# Patient Record
Sex: FEMALE | Race: WHITE
[De-identification: names, ages, dates, MRNs, and addresses within clinical notes are randomized per-mention and may not be internally consistent; named-entity substitution may affect disease eponyms.]

---

## 2017-08-18 NOTE — PCM.LDHP
L&D History of Present Illness





- General


Date of Service: 17


Admit Problem/Dx: 


 Patient Status Order with Admit Dx/Problem





17 16:39


Patient Status [ADT] Routine 








 Admission Diagnosis/Problem











Admission Diagnosis/Problem    High risk pregnancy














Source of Information: Patient


History Limitations: Reports: No Limitations





- History of Present Illness


Introduction:: 





26-year-old  at 39w0d presented to L&D as a transfer from the clinic for 

gestational hypertension.  Her BP in the clinic was 160/115 and 150/98.  She 

denies any headaches, vision changes or swelling.  Baby has been active.  No 

vaginal bleeding or leaking of fluid.





- Related Data


Allergies/Adverse Reactions: 


 Allergies











Allergy/AdvReac Type Severity Reaction Status Date / Time


 


Penicillins Allergy  Hives Verified 17 16:36











Home Medications: 


 Home Meds





PNV95/Ferrous Fumarate/FA [Prenatal Tablet] 1 tab PO DAILY 17 [History]











Past Medical History


Cardiovascular History: Reports: Hypertension (Was on meds for a while, none 

for a while)





Social & Family History





- Family History


Cardiac: Reports: CAD (Paternal grandfather)


OBGYN: Reports: Other (See Below) (sister with preeclampsia)





H&P Review of Systems





- Review of Systems:


Review Of Systems: See Below


General: Reports: No Symptoms


HEENT: Reports: No Symptoms


Pulmonary: Reports: No Symptoms


Cardiovascular: Reports: No Symptoms


Gastrointestinal: Reports: No Symptoms


Genitourinary: Reports: No Symptoms


Musculoskeletal: Reports: No Symptoms


Skin: Reports: No Symptoms





L&D Exam





- Exam


Exam: See Below





- OB Specific


Fundal Height In cm: 39


Fetal Movement: Active


Fetal Heart Tones: Present


Fetal Heart Tones per Min: 135 (Doppler in clinic)


Birth Presentation: Vertex





- Alegre Score


Alegre Score Cervix Position: Posterior


Alegre Score Consistency: Soft


Alegre Score Effacement: 31-50%


Alegre Score Dilation: 1-2 cm


Alegre Score Infant's Station: -2


Alegre Score Total: 5





- Exam


General: Alert, Oriented


HEENT: Conjunctiva Clear, Posterior Pharynx Clear, Pupils Equal


Lungs: Clear to Auscultation, Normal Respiratory Effort


Cardiovascular: Regular Rate, Regular Rhythm.  No: Systolic Murmur, Diastolic 

Murmur


Genitourinary: Normal external exam


Back Exam: Normal Inspection


Extremities: No Pedal Edema (Trace to lower extremities bilaterally)


Skin: Warm, Dry, Intact





- Problem List


(1) Prenatal care in third trimester


SNOMED Code(s): 847369496, 55720194, 48303099, 620219814, 284931710


   ICD Code: Z34.93 - ENCNTR FOR SUPRVSN OF NORMAL PREG, UNSP, THIRD TRIMESTER 

  Status: Acute   Current Visit: Yes   





(2) Gestational hypertension


SNOMED Code(s): 00665949


   ICD Code: O13.9 - GESTATIONAL HTN W/O SIGNIFICANT PROTEINURIA, UNSP 

TRIMESTER   Status: Acute   Current Visit: Yes   


Problem List Initiated/Reviewed/Updated: Yes


Orders Last 24hrs: 


 Active Orders 24 hr











 Category Date Time Status


 


 Patient Status [ADT] Routine ADT  17 16:39 Ordered


 


 Communication Order [RC] ASDIRECTED Care  17 16:39 Ordered


 


 Communication Order [RC] ASDIRECTED Care  17 16:42 Ordered


 


 Communication Order [RC] ASDIRECTED Care  17 16:42 Ordered


 


 Communication Order [RC] ASDIRECTED Care  17 16:42 Ordered


 


 Communication Order [RC] ASDIRECTED Care  17 16:42 Ordered


 


 Communication Order [RC] ASDIRECTED Care  17 16:42 Ordered


 


 Fetal Heart Tones [RC] PER UNIT ROUTINE Care  17 16:39 Ordered


 


 Fetal Monitoring [RC] PER UNIT ROUTINE Care  17 16:42 Ordered


 


 Notify Provider Vital Signs OB [RC] ASDIRECTED Care  17 16:39 Ordered


 


 Notify Provider [RC] PRN Care  17 16:39 Ordered


 


 Notify Provider [RC] PRN Care  17 16:42 Ordered


 


 Notify Provider [RC] PRN Care  17 16:42 Ordered


 


 Notify Provider [RC] STAT Care  17 16:42 Ordered


 


 Peripheral IV Care [RC] .AS DIRECTED Care  17 16:43 Ordered


 


 Pump Management, Intrathecal [RC] ASDIRECTED Care  17 16:38 Ordered


 


 Up ad Elaine [RC] ASDIRECTED Care  17 16:39 Ordered


 


 Vaginal Exam [RC] PRN Care  17 16:42 Ordered


 


 Vital Signs [RC] PER UNIT ROUTINE Care  17 16:39 Ordered


 


 Clear Liquid Diet [DIET] Diet  17 Dinner Ordered


 


 ALANINE AMINOTRANSFERASE,ALT [CHEM] Stat Lab  17 16:41 Ordered


 


 ASPARTATE AMNIOTRANSFERASE,AST [CHEM] Stat Lab  17 16:41 Ordered


 


 BLOOD UREA NITROGEN,BUN [CHEM] Stat Lab  17 16:41 Ordered


 


 CBC WITH AUTO DIFF [HEME] Stat Lab  17 16:41 Ordered


 


 LACTATE DEHYDROGENASE,LDH [CHEM] Stat Lab  17 16:41 Ordered


 


 PROTEIN/CREATININE RATIO URINE Stat Lab  17 16:42 Ordered


 


 URIC ACID [CHEM] Stat Lab  17 16:41 Ordered


 


 Acetaminophen [Tylenol] Med  17 16:38 Ordered





 650 mg PO Q4H PRN   


 


 Carboprost Tromethamine [Hemabate DS] Med  17 16:38 Ordered





 250 mcg IM ASDIRECTED PRN   


 


 Labetalol [Normodyne] Med  17 17:30 Ordered





 100 mg PO TID   


 


 Labetalol [Normodyne] Med  17 16:46 Ordered





 20 mg IV Q6HR PRN   


 


 Lactated Ringers @ 125 MLS/HR(1000ml) Med  17 16:45 Ordered





 Lactated Ringers [Ringers, Lactated] 1,000 ml   





 IV ASDIRECTED   


 


 Lactated Ringers [Ringers, Lactated] 500 ml Med  17 16:38 Ordered





 IV .BOLUS   


 


 Lidocaine 1% [Xylocaine-MPF 1%] Med  17 16:38 Ordered





 10 ml INJECT ASDIRECTED PRN   


 


 Methylergonovine [Methergine] Med  17 16:38 Ordered





 0.2 mg IM ASDIRECTED PRN   


 


 Misoprostol [Cytotec] Med  17 16:42 Ordered





 25 mcg VAG Q4H PRN   


 


 Misoprostol [Cytotec] Med  17 16:38 Ordered





 800 mcg RECTAL ASDIRECTED PRN   


 


 Nalbuphine [Nubain] Med  17 16:38 Ordered





 10 mg IVPUSH Q3H PRN   


 


 Ondansetron [Zofran] Med  17 16:38 Ordered





 4 mg IV Q4H PRN   


 


 Oxytocin 30 Units in NS @ 2 MUNITS/MIN(500ml) Med  17 16:45 Ordered





 Oxytocin/Normal Saline [Pitocin in NS 30 UNIT/500 ML]   





 30 unit in 500 ml IV TITRATE   


 


 Sodium Chloride 0.9% [Saline Flush] Med  17 16:38 Ordered





 10 ml FLUSH ASDIRECTED PRN   


 


 fentaNYL [Sublimaze] Med  17 16:38 Ordered





 50 mcg IVPUSH Q1H PRN   


 


 PIH Panel [OM.PC] Stat Oth  17 16:41 Ordered


 


 Peripheral IV Insertion Adult [OM.PC] Urgent Oth  17 16:42 Ordered


 


 Saline Lock Insert [OM.PC] Routine Oth  17 16:39 Ordered


 


 Resuscitation Status Routine Resus Stat  17 16:38 Ordered








 Medication Orders





Acetaminophen (Tylenol)  650 mg PO Q4H PRN


   PRN Reason: Pain (Mild 1-3) and fever


Carboprost Tromethamine (Hemabate Ds)  250 mcg IM ASDIRECTED PRN


   PRN Reason: HEMORRHAGE


Fentanyl (Sublimaze)  50 mcg IVPUSH Q1H PRN


   PRN Reason: Pain (moderate 4-6)


Lactated Ringer's (Ringers, Lactated)  500 mls @ 999 mls/hr IV .BOLUS ONE


   Stop: 17 17:08


Lactated Ringer's (Ringers, Lactated)  1,000 mls @ 125 mls/hr IV ASDIRECTED EAN


Oxytocin/Sodium Chloride (Pitocin In Ns 30 Unit/500 Ml)  30 unit in 500 mls @ 2 

mls/hr IV TITRATE EAN; 2 MUNITS/MIN


   PRN Reason: Protocol


Labetalol HCl (Normodyne)  20 mg IV Q6HR PRN; Protocol


   PRN Reason: Hypertension


Labetalol HCl (Normodyne)  100 mg PO TID EAN


Lidocaine HCl (Xylocaine-Mpf 1%)  10 ml INJECT ASDIRECTED PRN


   PRN Reason: Perineal Repair


Methylergonovine Maleate (Methergine)  0.2 mg IM ASDIRECTED PRN


   PRN Reason: Hemorrhage


Misoprostol (Cytotec)  800 mcg RECTAL ASDIRECTED PRN


   PRN Reason: Hemorrhage


Misoprostol (Cytotec)  25 mcg VAG Q4H PRN


   PRN Reason: cervical ripening


   Stop: 17 20:43


Nalbuphine HCl (Nubain)  10 mg IVPUSH Q3H PRN


   PRN Reason: Pain (moderate 4-6)


Ondansetron HCl (Zofran)  4 mg IV Q4H PRN


   PRN Reason: Nausea/Vomiting


Sodium Chloride (Saline Flush)  10 ml FLUSH ASDIRECTED PRN


   PRN Reason: Keep Vein Open








Assessment/Plan Comment:: 





1. Admit to L&D.  Initiate routine intrapartum orders


2. Will obtain PIH labs to assess for preeclampsia.  If labs are abnormal, we 

will need to start magnesium.  We will also need to start magnesium if blood 

pressure remain in the severe range.  Labetolol ordered for BP control.


3. Plan for Cytotec induction.  Will add pitocin and/or AROM for augmentation 

when able.


4. Patient advised that we will try for a vaginal delivery but she is at 

increased risk for cesrean section as her cervix is not very favorable.  

Patient understands these risks, and all questions were answered.





Iliana Avila MD

## 2017-08-18 NOTE — PCM.PREANE
Preanesthetic Assessment





- Procedure


Proposed Procedure: 





 Section





- Anesthesia/Transfusion/Family Hx


Anesthesia History: No Prior Anesthesia


Family History of Anesthesia Reaction: No


Transfusion History: No Prior Transfusion(s)


Intubation History: Unknown





- Review of Systems


General: No Symptoms


Pulmonary: No Symptoms


Cardiovascular: No Symptoms


Gastrointestinal: No Symptoms


Neurological: No Symptoms


Other: Reports: None





- Physical Assessment


NPO Status Date: 17


NPO Status Time: 18:00


Pulse: 92


O2 Sat by Pulse Oximetry: 100


Respiratory Rate: 16


Blood Pressure: 166/101


Temperature: 97.6 F


Vital Signs: 





 Last Vital Signs











Temp  99.1 F   17 17:05


 


Pulse  91   17 18:00


 


Resp  16   17 18:00


 


BP  160/98 H  17 18:00


 


Pulse Ox      











Height: 1.68 m


Weight: 109.316 kg


ASA Class: 2E


Mental Status: Alert & Oriented x3


Airway Class: Mallampati = 2


Dentition: Reports: Normal Dentition


ROM/Head Extension: Full


Lungs: Clear to Auscultation, Normal Respiratory Effort


Cardiovascular: Regular Rate, Regular Rhythm





- Lab


Values: 





 Laboratory Last Values











WBC  12.6 10^3/uL (5.0-10.0)  H  17  16:55    


 


RBC  4.23 10^6/uL (4.2-5.4)   17  16:55    


 


Hgb  12.2 g/dL (12.0-16.0)   17  16:55    


 


Hct  36.3 % (37.0-47.0)  L  17  16:55    


 


MCV  85.8 fL ()   17  16:55    


 


MCH  28.8 pg (27.0-34.0)   17  16:55    


 


MCHC  33.6 g/dL (33.0-35.0)   17  16:55    


 


Plt Count  280 10^3/uL (150-450)   17  16:55    


 


Neut % (Auto)  73.7 % (42.2-75.2)   17  16:55    


 


Lymph % (Auto)  16.8 % (20.5-50.1)  L  17  16:55    


 


Mono % (Auto)  9.2 % (2-8)  H  17  16:55    


 


Eos % (Auto)  0.2 % (1.0-3.0)  L  17  16:55    


 


Baso % (Auto)  0.1 % (0.0-1.0)   17  16:55    


 


BUN  16 mg/dL (7-18)   17  16:55    


 


Creatinine  1.0 mg/dL (0.6-1.3)   17  16:55    


 


Est Cr Clr Drug Dosing  TNP   17  16:55    


 


Estimated GFR (MDRD)  > 60   17  16:55    


 


Uric Acid  5.6 mg/dL (2.6-7.2)   17  16:55    


 


AST  18 IU/L (10-42)   17  16:55    


 


ALT  13 IU/L (10-60)   17  16:55    


 


Lactate Dehydrogenase  119 IU/L ()   17  16:55    


 


Urine Color  Yellow  (YELLOW)   17  17:10    


 


Urine Appearance  Slightly cloudy  (CLEAR)   17  17:10    


 


Urine pH  5.5  (5.0-9.0)   17  17:10    


 


Ur Specific Gravity  1.025  (1.005-1.030)   17  17:10    


 


Urine Protein  100  (NEGATIVE)  H  17  17:10    


 


Urine Glucose (UA)  Negative  (NEGATIVE)   17  17:10    


 


Urine Ketones  Negative  (NEGATIVE)   17  17:10    


 


Urine Occult Blood  Negative  (NEGATIVE)   17  17:10    


 


Urine Nitrite  Negative  (NEGATIVE)   17  17:10    


 


Urine Bilirubin  Negative  (NEGATIVE)   17  17:10    


 


Urine Urobilinogen  0.2 mg/dL (0.2-1.0)   17  17:10    


 


Ur Leukocyte Esterase  Negative  (NEGATIVE)   17  17:10    


 


Ur Random Creatinine  212 mg/dL  17  17:10    


 


U Random Total Protein  68 mg/dL (0.00-9.9)  H  17  17:10    


 


Protein/Creatinin Ratio  0.32   17  17:10    


 


Blood Type  A POSITIVE   17  16:55    


 


Gel Antibody Screen  Negative   17  16:55    














- Allergies


Allergies/Adverse Reactions: 


 Allergies











Allergy/AdvReac Type Severity Reaction Status Date / Time


 


Penicillins Allergy  Hives Verified 17 16:36














- Blood


Blood Available: No


Product(s) Available: None





- Anesthesia Plan


Pre-Op Medication Ordered: None





- Acknowledgements


Anesthesia Type Planned: Spinal


Pt an Appropriate Candidate for the Planned Anesthesia: Yes


Alternatives and Risks of Anesthesia Discussed w Pt/Guardian: Yes


Pt/Guardian Understands and Agrees with Anesthesia Plan: Yes


Additional Comments: 





R/B of spinal vs general anesthesia is discussed with patient.  Plan A is for 

spinal and Plan B is for General anesthesia patient agreed and signed consent. 





PreAnesthesia Questionnaire





- Past Health History


Medical/Surgical History: Denies Medical/Surgical History


Cardiovascular History: Reports: Hypertension (Was on meds for a while, none 

for a while)


Other Cardiovascular History: Gestational HTN


OB/GYN History: Reports: Pregnancy





- SUBSTANCE USE


Smoking Status *Q: Never Smoker


Second Hand Smoke Exposure: No


Recreational Drug Use History: No





- HOME MEDS


Home Medications: 


 Home Meds





PNV95/Ferrous Fumarate/FA [Prenatal Tablet] 1 tab PO DAILY 17 [History]











- CURRENT (IN HOUSE) MEDS


Current Meds: 





 Current Medications





Acetaminophen (Tylenol)  650 mg PO Q4H PRN


   PRN Reason: Pain (Mild 1-3) and fever


Carboprost Tromethamine (Hemabate Ds)  250 mcg IM ASDIRECTED PRN


   PRN Reason: HEMORRHAGE


Oxytocin/Sodium Chloride (Pitocin In Ns 30 Unit/500 Ml)  30 unit in 500 mls @ 2 

mls/hr IV TITRATE EAN; 2 MUNITS/MIN


   PRN Reason: Protocol


   Last Admin: 17 19:45 Dose:  2 munits/min, 125 mls/hr


Labetalol HCl (Normodyne)  20 mg IV Q6HR PRN; Protocol


   PRN Reason: Hypertension


   Last Admin: 17 17:16 Dose:  20 mg


Labetalol HCl (Normodyne)  100 mg PO TID EAN


   Last Admin: 17 17:15 Dose:  100 mg


Methylergonovine Maleate (Methergine)  0.2 mg IM ASDIRECTED PRN


   PRN Reason: Hemorrhage


Misoprostol (Cytotec)  800 mcg RECTAL ASDIRECTED PRN


   PRN Reason: Hemorrhage


Misoprostol (Cytotec)  25 mcg VAG Q4H PRN


   PRN Reason: cervical ripening


   Stop: 17 20:43


Sodium Chloride (Saline Flush)  10 ml FLUSH ASDIRECTED PRN


   PRN Reason: Keep Vein Open





Discontinued Medications





Citric Acid/Sodium Citrate (Bicitra Solution)  30 ml PO ONETIME ONE


   Stop: 17 17:56


   Last Admin: 17 18:18 Dose:  30 ml


Fentanyl (Sublimaze)  50 mcg IVPUSH Q1H PRN


   PRN Reason: Pain (moderate 4-6)


Lactated Ringer's (Ringers, Lactated)  500 mls @ 999 mls/hr IV .BOLUS ONE


   Stop: 17 17:08


Lactated Ringer's (Ringers, Lactated)  1,000 mls @ 125 mls/hr IV ASDIRECTED EAN


   Last Admin: 17 18:19 Dose:  125 mls/hr


Cefazolin Sodium/Dextrose 2 gm (/ Premix)  50 mls @ 100 mls/hr IV ONETIME ONE


   Stop: 17 18:24


   Last Admin: 17 18:30 Dose:  100 mls/hr


Oxytocin/Sodium Chloride (Pitocin In Ns 30 Unit/500 Ml) Confirm Administered 

Dose 60 unit in 1,000 mls @ as directed .ROUTE .STK-MED ONE


   Stop: 17 18:23


Ketorolac Tromethamine (Toradol) Confirm Administered Dose 30 mg .ROUTE .STK-

MED ONE


   Stop: 17 18:15


Lidocaine HCl (Xylocaine-Mpf 1%)  10 ml INJECT ASDIRECTED PRN


   PRN Reason: Perineal Repair


Midazolam HCl (Versed 1 Mg/Ml) Confirm Administered Dose 2 mg .ROUTE .STK-MED 

ONE


   Stop: 17 18:14


Morphine Sulfate (Duramorph Pf) Confirm Administered Dose 1 mg .ROUTE .STK-MED 

ONE


   Stop: 17 18:15


Nalbuphine HCl (Nubain)  10 mg IVPUSH Q3H PRN


   PRN Reason: Pain (moderate 4-6)


Ondansetron HCl (Zofran)  4 mg IV Q4H PRN


   PRN Reason: Nausea/Vomiting


Ondansetron HCl (Zofran) Confirm Administered Dose 4 mg .ROUTE .STK-MED ONE


   Stop: 17 18:15


Oxytocin (Pitocin)  10 unit IM ONETIME ONE


   Stop: 17 16:39

## 2017-08-18 NOTE — PCM.PRNOTE
- Free Text/Narrative


Note: 





 Section Operative Report





Date of Surgery: 17





Surgeon:  Iliana Avila MD





Assistant: Joey August MD





Pre-Operative Diagnosis:  


Pre-elampsia


Non-reassuring fetal status


    


Post-Operative Diagnosis:  Same





Procedure Performed: Primary low transverse  section





Anesthesia:  Spinal





EBL:  550 mL





IVF:  1800 mL





Drains: Snow catheter with 250 mL of urine output





Specimens:  None





Complications:  None apparent





Findings:  Normal uterus, tubes, and ovaries.





Indication and Consent:


Upon arrival to the floor for induction for preeclampsia, fetal heart tracing 

was not reassuring for induction of labor.   section was recommended to 

the patient for fetal wellbeing. The patient understood that the risks of 

 section include, but are not limited to, visceral or vascular injury, 

infection, blood loss and need for blood transfusion, prolonged hospitalization

, and reoperation.  The patient stated understanding and desired to proceed.  

All questions were answered.





Procedure in Detail:


The patient was taken to the operating room.  Snow catheter and pneumoboots 

were placed.  She was then prepped and draped in routine fashion in dorsal 

supine position with a left avalos tilt.  Two grams of cefazolin (Ancef) were 

given for infection prophylaxis.   Spinal anesthesia was administered.  SROM 

occured a few minutes prior to surgery.  Moderate clear fluid was noted.  A 

Pfannenstiel skin incision was made with a scalpel and carried down to the 

fascia.  The fascia was incised and extended laterally.   The rectus 

musculature was  in the midline down to the level of the pubic 

symphysis.  The peritoneum was found to be free of adherent bowel or bladder 

tissue and entered bluntly.  The peritoneal opening was then extended 

superiorly and inferiorly to the bladder reflection with good visualization of 

the bladder.  





The Kristian retractor was placed.  Brief intraabdominal survey revealed scant, 

clear peritoneal fluid and thinned-out lower uterine segment.  The lower 

uterine segment was incised with a scalpel.  The amniotic sac was ruptured with 

an Allis clamp and clear fluid was noted.  The uterine incision was extended 

bluntly with lateral and upward traction.  





The fetus was in vertex position.  The head was elevated out of the maternal 

pelvis with special attention paid to avoid using the uterine incision as a 

fulcrum.  Gentle fundal pressure was applied once the head was brought into the 

incision.  A Kiwi vacuum was used to assist the head out of the uterine 

incision.  The infant was delivered with minimal difficulty.   Bulb suctioning 

of the infant's nose and mouth was performed on the operative field.  The cord 

was clamped and cut in standard fashion, and the infant was handed over to the 

awaiting nursery staff.  IV oxytocin was initiated to facilitate uterine 

contractions.





The placenta was delivered intact with manual message of the uterine fundus 

along with gentle cord traction.  The uterus was then exteriorized.  The inside 

of the uterus was gently wiped with a lap sponge to assure complete removal of 

remaining products of conception.  The uterine incision was closed with 0 -

Vicryl suture in a running locked fashion.  A second imbricating layer of 0-

Vicryl was also placed.  The incision was inspected and hemostasis achieved.  

The ovaries and tubes were visualized and found to be normal.   The uterus, 

tubes, and ovaries were returned to the abdominal cavity.  The blood clots and 

fluid were wiped out of the abdomen and pelvis with moist laparotomy sponges.  

The uterine incision was re-inspected along with all other incised surfaces and 

good hemostasis was confirmed.  The Kristian retractor was removed.





The peritoneus was then closed using 2-0 Vicyrl.  The fascia was then closed 

with 2-0 looped PDS suture with care not to include any underlying abdominal 

contents.   The sub-cutaneous layer was reapproximated with plain suture.  The 

skin was closed with 3-0 suture on a Tc needle in a subcuticular fashion.  

Dressing was applied.  Sponge and instrument counts were reported as correct 

times two. Pt tolerated procedure well and was taken to PACU in stable 

condition.  





Iliana Avila MD

## 2017-08-18 NOTE — PCM.SN
- Free Text/Narrative


Note: 





17





Called by Yanet Wilde RN to evaluate fetal monitoring prior to induction of 

labor.  Fetal heart tracings have been consistently in the 160s-170s with 

minimal variability most of the time, making it unsafe to proceed with 

induction.  I explained this to the patient, and she agreed to proceed with 

 delivery.  Patient voices her understanding and agrees to proceed.  

The risks of surgery, including but not limited to infection, blood loss and 

damage to surrounding structures, were reviewed.  Consents were signed.


Patient was also noted to have proteinuria, giving her a diagnosis of 

preeclampsia.  Will start magnesium after delivery.  Patient has received 2 

doses of labetalol for hypertension.


Will proceed to the OR ASAP.





Iliana Avila MD

## 2017-08-18 NOTE — PCM.DEL
L & D Note





- General Info


Date of Service: 17


Mother's Due Date: 17





- Delivery Note


Delivery Outcome: Livebirth


Infant Delivery Method: Primary 


Infant Delivery Mode: Vacuum Extraction


Birth Presentation: Vertex


Nuchal Cord: None


Anesthesia Type: Spinal


Placenta: Intact, Manual Removal


Cord: 3 Vessels


Estimated Blood Loss: 550


Resuscitation Needed: No


APGAR Score 1 min: 9


APGAR Score 5 min: 9


Delivery Comments (Free Text/Narrative):: 





Please see procedure note for details





- Patient Data


Vitals - Most Recent: 


 Last Vital Signs











Temp  36.4 C   17 20:00


 


Pulse  92   17 20:00


 


Resp  16   17 20:00


 


BP  166/101 H  17 20:00


 


Pulse Ox  100   17 20:00











Weight - Most Recent: 109.316 kg


I&O - Last 24 Hours: 


 Intake & Output











 17





 06:59 14:59 22:59


 


Intake Total   1000


 


Balance   1000











Lab Results Last 24 Hours: 


 Laboratory Results - last 24 hr











  17 Range/Units





  16:55 16:55 16:55 


 


WBC  12.6 H    (5.0-10.0)  10^3/uL


 


RBC  4.23    (4.2-5.4)  10^6/uL


 


Hgb  12.2    (12.0-16.0)  g/dL


 


Hct  36.3 L    (37.0-47.0)  %


 


MCV  85.8    ()  fL


 


MCH  28.8    (27.0-34.0)  pg


 


MCHC  33.6    (33.0-35.0)  g/dL


 


Plt Count  280    (150-450)  10^3/uL


 


Neut % (Auto)  73.7    (42.2-75.2)  %


 


Lymph % (Auto)  16.8 L    (20.5-50.1)  %


 


Mono % (Auto)  9.2 H    (2-8)  %


 


Eos % (Auto)  0.2 L    (1.0-3.0)  %


 


Baso % (Auto)  0.1    (0.0-1.0)  %


 


BUN   16   (7-18)  mg/dL


 


Creatinine    1.0  (0.6-1.3)  mg/dL


 


Est Cr Clr Drug Dosing    TNP  


 


Estimated GFR (MDRD)    > 60  


 


Uric Acid   5.6   (2.6-7.2)  mg/dL


 


AST   18   (10-42)  IU/L


 


ALT   13   (10-60)  IU/L


 


Lactate Dehydrogenase   119   ()  IU/L


 


Urine Color     (YELLOW)  


 


Urine Appearance     (CLEAR)  


 


Urine pH     (5.0-9.0)  


 


Ur Specific Gravity     (1.005-1.030)  


 


Urine Protein     (NEGATIVE)  


 


Urine Glucose (UA)     (NEGATIVE)  


 


Urine Ketones     (NEGATIVE)  


 


Urine Occult Blood     (NEGATIVE)  


 


Urine Nitrite     (NEGATIVE)  


 


Urine Bilirubin     (NEGATIVE)  


 


Urine Urobilinogen     (0.2-1.0)  mg/dL


 


Ur Leukocyte Esterase     (NEGATIVE)  


 


Ur Random Creatinine     mg/dL


 


U Random Total Protein     (0.00-9.9)  mg/dL


 


Protein/Creatinin Ratio     


 


Blood Type     


 


Gel Antibody Screen     














  17 Range/Units





  16:55 17:10 17:10 


 


WBC     (5.0-10.0)  10^3/uL


 


RBC     (4.2-5.4)  10^6/uL


 


Hgb     (12.0-16.0)  g/dL


 


Hct     (37.0-47.0)  %


 


MCV     ()  fL


 


MCH     (27.0-34.0)  pg


 


MCHC     (33.0-35.0)  g/dL


 


Plt Count     (150-450)  10^3/uL


 


Neut % (Auto)     (42.2-75.2)  %


 


Lymph % (Auto)     (20.5-50.1)  %


 


Mono % (Auto)     (2-8)  %


 


Eos % (Auto)     (1.0-3.0)  %


 


Baso % (Auto)     (0.0-1.0)  %


 


BUN     (7-18)  mg/dL


 


Creatinine     (0.6-1.3)  mg/dL


 


Est Cr Clr Drug Dosing     


 


Estimated GFR (MDRD)     


 


Uric Acid     (2.6-7.2)  mg/dL


 


AST     (10-42)  IU/L


 


ALT     (10-60)  IU/L


 


Lactate Dehydrogenase     ()  IU/L


 


Urine Color   Yellow   (YELLOW)  


 


Urine Appearance   Slightly cloudy   (CLEAR)  


 


Urine pH   5.5   (5.0-9.0)  


 


Ur Specific Gravity   1.025   (1.005-1.030)  


 


Urine Protein   100 H   (NEGATIVE)  


 


Urine Glucose (UA)   Negative   (NEGATIVE)  


 


Urine Ketones   Negative   (NEGATIVE)  


 


Urine Occult Blood   Negative   (NEGATIVE)  


 


Urine Nitrite   Negative   (NEGATIVE)  


 


Urine Bilirubin   Negative   (NEGATIVE)  


 


Urine Urobilinogen   0.2   (0.2-1.0)  mg/dL


 


Ur Leukocyte Esterase   Negative   (NEGATIVE)  


 


Ur Random Creatinine    212  mg/dL


 


U Random Total Protein    68 H  (0.00-9.9)  mg/dL


 


Protein/Creatinin Ratio    0.32  


 


Blood Type  A POSITIVE    


 


Gel Antibody Screen  Negative    











Med Orders - Current: 


 Current Medications





Acetaminophen (Tylenol)  650 mg PO Q4H PRN


   PRN Reason: Pain (Mild 1-3) and fever


Carboprost Tromethamine (Hemabate Ds)  250 mcg IM ASDIRECTED PRN


   PRN Reason: HEMORRHAGE


Diphenhydramine HCl (Benadryl)  25 mg IVPUSH Q6H PRN


   PRN Reason: Itching or Nausea


Docusate Sodium (Colace)  100 mg PO Q12H PRN


   PRN Reason: Constipation


Ephedrine Sulfate (Ephedrine Sulfate)  5 mg IVPUSH SEECOMMENT PRN


   PRN Reason: Other


Oxytocin/Sodium Chloride (Pitocin In Ns 30 Unit/500 Ml)  30 unit in 500 mls @ 2 

mls/hr IV TITRATE EAN; 2 MUNITS/MIN


   PRN Reason: Protocol


   Last Admin: 17 19:45 Dose:  2 munits/min, 125 mls/hr


Lactated Ringer's (Ringers, Lactated)  1,000 mls @ 125 mls/hr IV ASDIRECTED EAN


Magnesium Sulfate 4 gm/ Premix  100 mls @ 300 mls/hr IV .BOLUS ONE


   Stop: 17 20:19


Magnesium Sulfate (Magnesium Sulfate 20 Gm In Water 500 Ml)  500 mls @ 50 mls/

hr IV ASDIRECTED EAN


Ibuprofen (Motrin)  800 mg PO Q8H PRN


   PRN Reason: mild pain or fever


Ketorolac Tromethamine (Toradol)  15 mg IVPUSH Q6H EAN


   Stop: 17 08:01


Labetalol HCl (Normodyne)  20 mg IV Q6HR PRN; Protocol


   PRN Reason: Hypertension


   Last Admin: 17 17:16 Dose:  20 mg


Labetalol HCl (Normodyne)  100 mg PO TID EAN


   Last Admin: 17 17:15 Dose:  100 mg


Methylergonovine Maleate (Methergine)  0.2 mg IM ASDIRECTED PRN


   PRN Reason: Hemorrhage


Misoprostol (Cytotec)  800 mcg RECTAL ASDIRECTED PRN


   PRN Reason: Hemorrhage


Misoprostol (Cytotec)  25 mcg VAG Q4H PRN


   PRN Reason: cervical ripening


   Stop: 17 20:43


Naloxone HCl (Narcan)  0.1 mg IVPUSH SEECOMMENT PRN


   PRN Reason: Respiratory Depression


Ondansetron HCl (Zofran)  4 mg IV Q4H PRN


   PRN Reason: Nausea/Vomiting


Oxycodone/Acetaminophen (Percocet 325-5 Mg)  1 tab PO Q4H PRN


   PRN Reason: Pain (moderate 4-6)


Oxycodone/Acetaminophen (Percocet 325-5 Mg)  2 tab PO Q4H PRN


   PRN Reason: Pain (moderate 4-6)


Simethicone (Simethicone)  80 mg PO Q4H PRN


   PRN Reason: Gas


Sodium Chloride (Saline Flush)  10 ml FLUSH ASDIRECTED PRN


   PRN Reason: Keep Vein Open





Discontinued Medications





Citric Acid/Sodium Citrate (Bicitra Solution)  30 ml PO ONETIME ONE


   Stop: 17 17:56


   Last Admin: 17 18:18 Dose:  30 ml


Fentanyl (Sublimaze)  50 mcg IVPUSH Q1H PRN


   PRN Reason: Pain (moderate 4-6)


Lactated Ringer's (Ringers, Lactated)  500 mls @ 999 mls/hr IV .BOLUS ONE


   Stop: 17 17:08


Lactated Ringer's (Ringers, Lactated)  1,000 mls @ 125 mls/hr IV ASDIRECTED [facility]


   Last Admin: 17 18:19 Dose:  125 mls/hr


Cefazolin Sodium/Dextrose 2 gm (/ Premix)  50 mls @ 100 mls/hr IV ONETIME ONE


   Stop: 17 18:24


   Last Admin: 17 18:30 Dose:  100 mls/hr


Oxytocin/Sodium Chloride (Pitocin In Ns 30 Unit/500 Ml) Confirm Administered 

Dose 60 unit in 1,000 mls @ as directed .ROUTE .STK-MED ONE


   Stop: 17 18:23


Ketorolac Tromethamine (Toradol) Confirm Administered Dose 30 mg .ROUTE .STK-

MED ONE


   Stop: 17 18:15


Lidocaine HCl (Xylocaine-Mpf 1%)  10 ml INJECT ASDIRECTED PRN


   PRN Reason: Perineal Repair


Midazolam HCl (Versed 1 Mg/Ml) Confirm Administered Dose 2 mg .ROUTE .STK-MED 

ONE


   Stop: 17 18:14


Morphine Sulfate (Duramorph Pf) Confirm Administered Dose 1 mg .ROUTE .STK-MED 

ONE


   Stop: 17 18:15


Nalbuphine HCl (Nubain)  10 mg IVPUSH Q3H PRN


   PRN Reason: Pain (moderate 4-6)


Ondansetron HCl (Zofran)  4 mg IV Q4H PRN


   PRN Reason: Nausea/Vomiting


Ondansetron HCl (Zofran) Confirm Administered Dose 4 mg .ROUTE .STK-MED ONE


   Stop: 17 18:15


Oxytocin (Pitocin)  10 unit IM ONETIME ONE


   Stop: 17 16:39











- Problem List & Annotations


(1) Prenatal care in third trimester


SNOMED Code(s): 557359083, 39488072, 73875339, 998573971, 228902523


   Code(s): Z34.93 - ENCNTR FOR SUPRVSN OF NORMAL PREG, UNSP, THIRD TRIMESTER   

Status: Acute   Current Visit: Yes   





(2) Preeclampsia


SNOMED Code(s): 616875524


   Code(s): O14.90 - UNSPECIFIED PRE-ECLAMPSIA, UNSPECIFIED TRIMESTER   Status: 

Acute   Current Visit: Yes   





(3) Status post  delivery


SNOMED Code(s): 745020471


   Code(s): Z98.891 - HISTORY OF UTERINE SCAR FROM PREVIOUS SURGERY   Status: 

Acute   Current Visit: Yes   





- Problem List Review


Problem List Initiated/Reviewed/Updated: Yes





- My Orders


Last 24 Hours: 


My Active Orders





17 16:38


Pump Management, Intrathecal [RC] ASDIRECTED 


Acetaminophen [Tylenol]   650 mg PO Q4H PRN 


Carboprost Tromethamine [Hemabate DS]   250 mcg IM ASDIRECTED PRN 


Methylergonovine [Methergine]   0.2 mg IM ASDIRECTED PRN 


Misoprostol [Cytotec]   800 mcg RECTAL ASDIRECTED PRN 


Sodium Chloride 0.9% [Saline Flush]   10 ml FLUSH ASDIRECTED PRN 


Resuscitation Status Routine 





17 16:39


Patient Status [ADT] Routine 


Fetal Heart Tones [RC] PER UNIT ROUTINE 


Notify Provider Vital Signs OB [RC] ASDIRECTED 


Notify Provider [RC] PRN 


Up ad Elaine [RC] ASDIRECTED 


Vital Signs [RC] 00,04,08,12,16,20 


Saline Lock Insert [OM.PC] Routine 





17 16:41


PIH Panel [OM.PC] Stat 





17 16:42


Communication Order [RC] ASDIRECTED 


Notify Provider [RC] PRN 


Notify Provider [RC] PRN 


Notify Provider [RC] STAT 


Vaginal Exam [RC] PRN 


Misoprostol [Cytotec]   25 mcg VAG Q4H PRN 


Peripheral IV Insertion Adult [OM.PC] Urgent 





17 16:43


Peripheral IV Care [RC] .AS DIRECTED 





17 16:45


Oxytocin/Normal Saline [Pitocin in NS 30 UNIT/500 ML] 30 unit in 500 ml IV 

TITRATE 





17 16:46


Labetalol [Normodyne]   20 mg IV Q6HR PRN 





17 17:30


Labetalol [Normodyne]   100 mg PO TID 





17 17:55


Procedure Site Prep Instruct [RC] ASDIRECTED 


RT Incentive Spirometry [RC] ASDIRECTED 


Schedule Procedure [COMM] Per Unit Routine 





17 19:57


Antiembolic Devices [RC] PER UNIT ROUTINE 


Bedrest [RC] ASDIRECTED 


Communication Order [RC] PER UNIT ROUTINE 


Intake and Output [RC] Q8H 


Notify Provider Intake and Out [RC] ASDIRECTED 


RT Incentive Spirometry [RC] Q2HWA 


Urinary Catheter Removal [RC] Per Unit Routine 


Consult to Lactation Consultant [CONS] Routine 


Acetaminophen/oxyCODONE [Percocet 325-5 MG]   1 tab PO Q4H PRN 


Acetaminophen/oxyCODONE [Percocet 325-5 MG]   2 tab PO Q4H PRN 


Docusate Sodium [Colace]   100 mg PO Q12H PRN 


Ibuprofen [Motrin]   800 mg PO Q8H PRN 


Naloxone [Narcan]   0.1 mg IVPUSH SEECOMMENT PRN 


Ondansetron [Zofran]   4 mg IV Q4H PRN 


Simethicone   80 mg PO Q4H PRN 


diphenhydrAMINE [Benadryl]   25 mg IVPUSH Q6H PRN 


ePHEDrine [ePHEDrine Sulfate]   5 mg IVPUSH SEECOMMENT PRN 


Antiembolic Hose [OM.PC] Per Unit Routine 


Assess Lochia [WOMSER] Per Unit Routine 


Assess Uterine Involution [WOMSER] Per Unit Routine 


Breast Pump [WOMSER] Per Unit Routine 


Sequential Compression Device [OM.PC] Per Unit Routine 





17 20:00


Oxygen Therapy [RC] PRN 


Ketorolac [Toradol]   15 mg IVPUSH Q6H 


Lactated Ringers @ 125 MLS/HR(1000ml) Lactated Ringers [Ringers, Lactated] 1,

000 ml IV ASDIRECTED 


Magnesium Sulfate/Water [Magnesium Sulfate 20 GM in Water 500 ML] 500 ml IV 

ASDIRECTED 


Magnesium Sulfate/Water [Magnesium Sulfate 4 GM in Water 100 ML] 4 gm   Premix 

Bag 1 bag IV .BOLUS 


Deep Tendon Reflexes [WOMSER] Per Unit Routine 


Seizure Precautions [OM.PC] Per Unit Routine 





17 20:02


Equipment to Bedside [RC] PRN 


Notify Provider Status Change [RC] ASDIRECTED 





17 Breakfast


Clear Liquid Diet [DIET] 


Nothing Per Oral Diet [DIET] 





17 Dinner


Nothing Per Oral Diet [DIET] 





17 Lunch


Nothing Per Oral Diet [DIET] 


Regular Diet [DIET] 





17 05:00


MAGNESIUM [CHEM] Q8H 





17 08:00


CBC W/O DIFF,HEMOGRAM [HEME] Routine 





17 13:00


MAGNESIUM [CHEM] Q8H 





17 21:00


MAGNESIUM [CHEM] Q8H 





17 05:00


MAGNESIUM [CHEM] Q8H 














- Assessment


Assessment:: 





26-year-old, now , status post primary  section for preeclampsia 

and non-reassuring fetal heart tones





- Plan


Plan:: 





1. Initiate routine postoperative cares


2. Initiate magnesium for preeclampsia.  Will continue for 24 hours.  Check 

magnesium level every 8 hours after initiation.


3. Labetalol 100 mg TID.  Nursing advised to call if BP normal or low.


4. Mother plans to breastfeed


5. Anticipate discharge 17.  Dr. August will see tomorrow.  I will resume 

care on 17





Iliana Avila MD

## 2017-08-19 NOTE — PN
DATE:  2017

 

SUBJECTIVE:  The patient is doing well today.  She denies any headache.  She is

currently on magnesium sulfate at 2 g/hour.  She denies any respiratory

depression or any visual symptoms.  She has been up, ambulating this morning,

but she does admit that she feels somewhat groggy from the magnesium sulfate as

we would expect.  She has been ambulating with the nurse at her side or with her

 at her side.  She is tolerating regular diet this morning.  Her baby

also was doing very well this morning.

 

OBJECTIVE:  Her blood pressures are in the range of 118/74 and 123/64.  Her

postoperative hemoglobin this morning is 10.1.  She is getting 2 g/hour of

magnesium sulfate.  She does have good urine output.  She remains afebrile.  Her

serum magnesium level is 4.8 this morning.

 

IMPRESSION:  Stable course after  section last evening for preeclampsia.

Although, her serum Mag level is somewhat below the therapeutic range, we will

not increase it because of her slight degree of grogginess and lethargy that she

already has at this level.

 

PLAN:  We will continue with 2 g/hour of magnesium sulfate at the present time.

It should be mentioned that her deep tendon reflexes are 1+/4 bilaterally and

there is no ankle clonus.  Also, her Homans sign was negative and no calf

tenderness.  As mentioned above, she will gradually increase her ambulation with

 or nurse at her side.  We will begin iron sulfate 324 mg p.o. b.i.d.

with food or meals because of her hemoglobin being 10.1.  We will discontinue

her magnesium sulfate 24 hours postoperatively or at approximately 1900 hours

this evening.

 

DD:  2017 11:44:11

DT:  2017 12:00:58

[facility]

Job #:  [id number]

## 2017-08-19 NOTE — PCM.POSTAN
POST ANESTHESIA ASSESSMENT





- MENTAL STATUS


Mental Status: Alert, Oriented





- VITAL SIGNS


Pulse Rate: 82


SaO2: 99


Resp Rate: 16


Blood Pressure: 124/72


Temperature: 97.6 F





- RESPIRATORY


Respiratory Status: Respiratory Rate WNL, Airway Patent, O2 Saturation Stable





- CARDIOVASCULAR


CV Status: Pulse Rate WNL, Blood Pressure Stable





- GASTROINTESTINAL


GI Status: No Symptoms





- PAIN


Pain Score: 1





- POST OP HYDRATION


Hydration Status: Adequate & Stable





- OBSERVATIONS


Free Text/Narrative:: 





Fully recovered from her spinal anesthesia about to ambulate.  Pain, nausea and 

vomiting free.  pleased with her anesthetic plan of care.

## 2017-08-20 NOTE — PCM.PNPP
- General Info


Date of Service: 17


Subjective Update: 





26-year-old, now , status post primary  section for preeclampsia 

and  non-reassuring fetal heart tones.  Magensium was stopped around 1900 

yesterday.  Patient has been feeling well since.  Urine output has been 

adequate.  Pain is well controlled.  Blood pressures have been slightly high in 

with 130s-140s (occasional 150s) systolic and 90s diastolic.  No headaches.  

Tolerating a general diet.  Passing gas.  No concerns per nursing or patient.


Functional Status: Reports: Pain Controlled, Tolerating Diet, Ambulating, 

Urinating





- Review of Systems


General: Reports: No Symptoms


HEENT: Reports: No Symptoms


Pulmonary: Reports: No Symptoms


Cardiovascular: Reports: No Symptoms


Gastrointestinal: Reports: No Symptoms


Genitourinary: Reports: No Symptoms


Musculoskeletal: Reports: No Symptoms


Skin: Reports: No Symptoms





- General Info


Date of Service: 17





- Patient Data


Vital Signs - Most Recent: 


 Last Vital Signs











Temp  36.6 C   17 08:00


 


Pulse  78   17 08:00


 


Resp  16   17 08:00


 


BP  137/92 H  17 08:00


 


Pulse Ox  100   17 08:00











Weight - Most Recent: 109.316 kg


I&O - Last 24 Hours: 


 Intake & Output











 17





 22:59 06:59 14:59


 


Intake Total 3500  


 


Output Total 2250 1550 


 


Balance 1250 -1550 











Lab Results - Last 24 Hours: 


 Laboratory Results - last 24 hr











  17 Range/Units





  12:55 


 


Magnesium  5.7 H  (1.8-2.5)  mg/dL











Med Orders - Current: 


 Current Medications





Acetaminophen (Tylenol)  650 mg PO Q4H PRN


   PRN Reason: Pain (Mild 1-3) and fever


Carboprost Tromethamine (Hemabate Ds)  250 mcg IM ASDIRECTED PRN


   PRN Reason: HEMORRHAGE


Diphenhydramine HCl (Benadryl)  25 mg IVPUSH Q6H PRN


   PRN Reason: Itching or Nausea


   Last Admin: 17 00:03 Dose:  25 mg


Docusate Sodium (Colace)  100 mg PO Q12H PRN


   PRN Reason: Constipation


   Last Admin: 08/20/17 08:21 Dose:  100 mg


Ephedrine Sulfate (Ephedrine Sulfate)  5 mg IVPUSH SEECOMMENT PRN


   PRN Reason: Other


Ferrous Sulfate (Ferrous Sulfate)  325 mg PO BIDMEALS [facility]


   Last Admin: 17 08:21 Dose:  325 mg


Oxytocin/Sodium Chloride (Pitocin In Ns 30 Unit/500 Ml)  30 unit in 500 mls @ 2 

mls/hr IV TITRATE EAN; 2 MUNITS/MIN


   PRN Reason: Protocol


   Last Admin: 17 19:45 Dose:  2 munits/min, 125 mls/hr


Lactated Ringer's (Ringers, Lactated)  1,000 mls @ 125 mls/hr IV ASDIRECTED EAN


   Last Admin: 17 16:18 Dose:  50 mls/hr


Ibuprofen (Motrin)  800 mg PO Q8H PRN


   PRN Reason: mild pain or fever


   Last Admin: 17 03:53 Dose:  800 mg


Labetalol HCl (Normodyne)  20 mg IV Q6HR PRN; Protocol


   PRN Reason: Hypertension


   Last Admin: 17 17:16 Dose:  20 mg


Labetalol HCl (Normodyne)  100 mg PO BID [facility]


Methylergonovine Maleate (Methergine)  0.2 mg IM ASDIRECTED PRN


   PRN Reason: Hemorrhage


Misoprostol (Cytotec)  800 mcg RECTAL ASDIRECTED PRN


   PRN Reason: Hemorrhage


Naloxone HCl (Narcan)  0.1 mg IVPUSH SEECOMMENT PRN


   PRN Reason: Respiratory Depression


Ondansetron HCl (Zofran)  4 mg IV Q4H PRN


   PRN Reason: Nausea/Vomiting


Oxycodone/Acetaminophen (Percocet 325-5 Mg)  1 tab PO Q4H PRN


   PRN Reason: Pain (moderate 4-6)


Oxycodone/Acetaminophen (Percocet 325-5 Mg)  2 tab PO Q4H PRN


   PRN Reason: Pain (moderate 4-6)


   Last Admin: 17 08:22 Dose:  2 tab


Prenat Multivit//Iron/Folic Ac (Prenatal Plus Iron)  1 each PO 

WITHBREAKFAST [facility]


   Last Admin: 17 08:21 Dose:  1 each


Simethicone (Simethicone)  80 mg PO Q4H PRN


   PRN Reason: Gas


   Last Admin: 17 08:21 Dose:  80 mg


Sodium Chloride (Saline Flush)  10 ml FLUSH ASDIRECTED PRN


   PRN Reason: Keep Vein Open





Discontinued Medications





Calcium Gluconate (Calcium Gluconate)  1 gm IVPUSH ONETIME ONE


   Stop: 17 21:01


   Last Admin: 17 07:21 Dose:  Not Given


Calcium Gluconate (Calcium Gluconate)  1 gm IVPUSH ONETIME ONE


   Stop: 17 21:46


   Last Admin: 17 07:21 Dose:  Not Given


Citric Acid/Sodium Citrate (Bicitra Solution)  30 ml PO ONETIME ONE


   Stop: 17 17:56


   Last Admin: 17 18:18 Dose:  30 ml


Fentanyl (Sublimaze)  50 mcg IVPUSH Q1H PRN


   PRN Reason: Pain (moderate 4-6)


Lactated Ringer's (Ringers, Lactated)  500 mls @ 999 mls/hr IV .BOLUS ONE


   Stop: 17 17:08


   Last Admin: 17 20:00 Dose:  Not Given


Lactated Ringer's (Ringers, Lactated)  1,000 mls @ 125 mls/hr IV ASDIRECTED [facility]


   Last Admin: 17 18:19 Dose:  125 mls/hr


Cefazolin Sodium/Dextrose 2 gm (/ Premix)  50 mls @ 100 mls/hr IV ONETIME ONE


   Stop: 17 18:24


   Last Admin: 17 18:30 Dose:  100 mls/hr


Oxytocin/Sodium Chloride (Pitocin In Ns 30 Unit/500 Ml) Confirm Administered 

Dose 60 unit in 1,000 mls @ as directed .ROUTE .STK-MED ONE


   Stop: 17 18:23


Magnesium Sulfate 4 gm/ Premix  100 mls @ 300 mls/hr IV .BOLUS ONE


   Stop: 17 20:19


   Last Infusion: 17 21:38 Dose:  Infused


Magnesium Sulfate (Magnesium Sulfate 20 Gm In Water 500 Ml)  20 gm in 500 mls @ 

50 mls/hr IV ASDIRECTED [facility]


   Stop: 17 19:00


   Last Infusion: 17 19:23 Dose:  0 mls/hr


Ibuprofen (Motrin)  800 mg PO Q8H PRN


   PRN Reason: mild pain or fever


Ketorolac Tromethamine (Toradol) Confirm Administered Dose 30 mg .ROUTE .STK-

MED ONE


   Stop: 17 18:15


Ketorolac Tromethamine (Toradol)  15 mg IVPUSH Q6H [facility]


   Stop: 17 08:01


   Last Admin: 17 00:16 Dose:  Not Given


Ketorolac Tromethamine (Toradol)  15 mg IVPUSH Q6H [facility]


   Stop: 17 14:01


   Last Admin: 17 13:53 Dose:  15 mg


Labetalol HCl (Normodyne)  100 mg PO TID [facility]


   Last Admin: 17 17:15 Dose:  100 mg


Lidocaine HCl (Xylocaine-Mpf 1%)  10 ml INJECT ASDIRECTED PRN


   PRN Reason: Perineal Repair


Midazolam HCl (Versed 1 Mg/Ml) Confirm Administered Dose 2 mg .ROUTE .STK-MED 

ONE


   Stop: 17 18:14


Misoprostol (Cytotec)  25 mcg VAG Q4H PRN


   PRN Reason: cervical ripening


   Stop: 17 20:43


Morphine Sulfate (Duramorph Pf) Confirm Administered Dose 1 mg .ROUTE .STK-MED 

ONE


   Stop: 17 18:15


Nalbuphine HCl (Nubain)  10 mg IVPUSH Q3H PRN


   PRN Reason: Pain (moderate 4-6)


Ondansetron HCl (Zofran)  4 mg IV Q4H PRN


   PRN Reason: Nausea/Vomiting


Ondansetron HCl (Zofran) Confirm Administered Dose 4 mg .ROUTE .STK-MED ONE


   Stop: 17 18:15


Oxytocin (Pitocin)  10 unit IM ONETIME ONE


   Stop: 17 16:39


   Last Admin: 17 22:00 Dose:  Not Given











- Infant Interaction


Infant Disposition, Postpartum: [place] in Room with Family


Infant Interaction: Holding Infant


Infant Feeding: Bottle Fed Infant


Support Person: 





- Postpartum Recovery Exam


Fundal Tone: Firm


Fundal Level: 3 Fingerbreadths Below Umbilicus


Fundal Placement: Midline


Lochia Amount: Scant


Lochia Color: Rubra/Red


Perineum Description: Intact, Minimal Bruising/Swelling


Episiotomy/Laceration: None


Bladder Status: Voiding


Urinary Elimination: Voided





- Exam


General: Alert, Oriented


HEENT: Pupils Equal, Pupils Reactive


Lungs: Clear to Auscultation, Normal Respiratory Effort


Cardiovascular: Regular Rate, Regular Rhythm, No Murmurs


GI/Abdominal Exam: Soft, Non-Tender


Extremities: Pedal Edema (Trace to lower extremities bilaterally)


Skin: Warm, Dry, Intact


Wound/Incisions: Healing Well





- Problem List & Annotations


(1) Prenatal care in third trimester


SNOMED Code(s): 495711683, 36581349, 53533398, 125054341, 294869899


   Code(s): Z34.93 - ENCNTR FOR SUPRVSN OF NORMAL PREG, UNSP, THIRD TRIMESTER   

Status: Acute   Current Visit: Yes   





(2) Preeclampsia


SNOMED Code(s): 693554371


   Code(s): O14.90 - UNSPECIFIED PRE-ECLAMPSIA, UNSPECIFIED TRIMESTER   Status: 

Acute   Current Visit: Yes   





(3) Status post  delivery


SNOMED Code(s): 035434352


   Code(s): Z98.891 - HISTORY OF UTERINE SCAR FROM PREVIOUS SURGERY   Status: 

Acute   Current Visit: Yes   





- Problem List Review


Problem List Initiated/Reviewed/Updated: Yes





- My Orders


Last 24 Hours: 


My Active Orders





17 22:00


Ibuprofen [Motrin]   800 mg PO Q8H PRN 





17 11:30


Labetalol [Normodyne]   100 mg PO BID 














- Assessment


Assessment:: 





26-year-old, now , POD#2 status post primary  section for 

preeclampsia and non-reassuring fetal heart tones





- Plan


Plan:: 





1. Continue routine postoperative cares


2. Started Labetalol 100 mg BID for elevated BP


3. Patient decided to bottlefeed


4. Anticipate discharge 17.  





Iliana Avila MD

## 2017-08-21 NOTE — PCM.PNPP
- General Info


Date of Service: 17


Subjective Update: 


26-year-old, now , postpartum day #3 status post primary  section 

for preeclampsia and nonreassuring fetal status. Patient has been feeling well 

over the past 24 hours. She has had adequate urine output. She is tolerating a 

general diet. She denies any dizziness, headaches, or abdominal pain. She has 

had minimal vaginal bleeding. Per nursing, her blood pressures have been 

gradually climbing over the past 24 hours. This seems to be worsened by any 

stimulation at all. Patient was started on labetalol 100 mg twice daily 

yesterday. However, patient states that for about 60-90 minutes after taking 

the labetalol, she does not feel well. She feels dizzy and lightheaded. She has 

no other concerns today.





Functional Status: Reports: Pain Controlled, Tolerating Diet, Ambulating, New 

Symptoms (Increased blood pressures)





- Review of Systems


General: Reports: No Symptoms


HEENT: Reports: No Symptoms


Pulmonary: Reports: No Symptoms


Cardiovascular: Reports: No Symptoms


Gastrointestinal: Reports: No Symptoms


Genitourinary: Reports: No Symptoms


Musculoskeletal: Reports: No Symptoms


Skin: Reports: No Symptoms


Neurological: Reports: No Symptoms





- General Info


Date of Service: 17





- Patient Data


Vital Signs - Most Recent: 


 Last Vital Signs











Temp  36.2 C   17 08:00


 


Pulse  90   17 08:00


 


Resp  18   17 08:00


 


BP  130/91 H  17 08:57


 


Pulse Ox  99   17 08:00











Weight - Most Recent: 109.316 kg


Med Orders - Current: 


 Current Medications





Acetaminophen (Tylenol)  650 mg PO Q4H PRN


   PRN Reason: Pain (Mild 1-3) and fever


Carboprost Tromethamine (Hemabate Ds)  250 mcg IM ASDIRECTED PRN


   PRN Reason: HEMORRHAGE


Diphenhydramine HCl (Benadryl)  25 mg IVPUSH Q6H PRN


   PRN Reason: Itching or Nausea


   Last Admin: 17 00:03 Dose:  25 mg


Docusate Sodium (Colace)  100 mg PO Q12H PRN


   PRN Reason: Constipation


   Last Admin: 17 08:58 Dose:  100 mg


Ephedrine Sulfate (Ephedrine Sulfate)  5 mg IVPUSH SEECOMMENT PRN


   PRN Reason: Other


Ferrous Sulfate (Ferrous Sulfate)  325 mg PO BIDMEALS Formerly [facility], [facility]


   Last Admin: 17 08:58 Dose:  325 mg


Hydralazine HCl (Apresoline)  25 mg PO Q8H EAN


   Last Admin: 17 08:57 Dose:  25 mg


Oxytocin/Sodium Chloride (Pitocin In Ns 30 Unit/500 Ml)  30 unit in 500 mls @ 2 

mls/hr IV TITRATE EAN; 2 MUNITS/MIN


   PRN Reason: Protocol


   Last Admin: 17 19:45 Dose:  2 munits/min, 125 mls/hr


Lactated Ringer's (Ringers, Lactated)  1,000 mls @ 125 mls/hr IV ASDIRECTED EAN


   Last Admin: 17 16:18 Dose:  50 mls/hr


Ibuprofen (Motrin)  800 mg PO Q8H PRN


   PRN Reason: mild pain or fever


   Last Admin: 17 06:12 Dose:  800 mg


Labetalol HCl (Normodyne)  20 mg IV Q6HR PRN; Protocol


   PRN Reason: Hypertension


   Last Admin: 17 17:16 Dose:  20 mg


Methylergonovine Maleate (Methergine)  0.2 mg IM ASDIRECTED PRN


   PRN Reason: Hemorrhage


Misoprostol (Cytotec)  800 mcg RECTAL ASDIRECTED PRN


   PRN Reason: Hemorrhage


Naloxone HCl (Narcan)  0.1 mg IVPUSH SEECOMMENT PRN


   PRN Reason: Respiratory Depression


Ondansetron HCl (Zofran)  4 mg IV Q4H PRN


   PRN Reason: Nausea/Vomiting


Oxycodone/Acetaminophen (Percocet 325-5 Mg)  1 tab PO Q4H PRN


   PRN Reason: Pain (moderate 4-6)


   Last Admin: 17 01:15 Dose:  1 tab


Oxycodone/Acetaminophen (Percocet 325-5 Mg)  2 tab PO Q4H PRN


   PRN Reason: Pain (moderate 4-6)


   Last Admin: 17 10:06 Dose:  2 tab


Prenat Multivit/Cocke/Iron/Folic Ac (Prenatal Plus Iron)  1 each PO 

WITHBREAKFAST Formerly [facility], [facility]


   Last Admin: 17 08:57 Dose:  1 each


Simethicone (Simethicone)  80 mg PO Q4H PRN


   PRN Reason: Gas


   Last Admin: 17 06:13 Dose:  80 mg


Sodium Chloride (Saline Flush)  10 ml FLUSH ASDIRECTED PRN


   PRN Reason: Keep Vein Open





Discontinued Medications





Calcium Gluconate (Calcium Gluconate)  1 gm IVPUSH ONETIME ONE


   Stop: 17 21:01


   Last Admin: 17 07:21 Dose:  Not Given


Calcium Gluconate (Calcium Gluconate)  1 gm IVPUSH ONETIME ONE


   Stop: 17 21:46


   Last Admin: 17 07:21 Dose:  Not Given


Citric Acid/Sodium Citrate (Bicitra Solution)  30 ml PO ONETIME ONE


   Stop: 17 17:56


   Last Admin: 17 18:18 Dose:  30 ml


Fentanyl (Sublimaze)  50 mcg IVPUSH Q1H PRN


   PRN Reason: Pain (moderate 4-6)


Lactated Ringer's (Ringers, Lactated)  500 mls @ 999 mls/hr IV .BOLUS ONE


   Stop: 17 17:08


   Last Admin: 17 20:00 Dose:  Not Given


Lactated Ringer's (Ringers, Lactated)  1,000 mls @ 125 mls/hr IV ASDIRECTED [facility]


   Last Admin: 17 18:19 Dose:  125 mls/hr


Cefazolin Sodium/Dextrose 2 gm (/ Premix)  50 mls @ 100 mls/hr IV ONETIME ONE


   Stop: 17 18:24


   Last Admin: 17 18:30 Dose:  100 mls/hr


Oxytocin/Sodium Chloride (Pitocin In Ns 30 Unit/500 Ml) Confirm Administered 

Dose 60 unit in 1,000 mls @ as directed .ROUTE .STK-MED ONE


   Stop: 17 18:23


Magnesium Sulfate 4 gm/ Premix  100 mls @ 300 mls/hr IV .BOLUS ONE


   Stop: 17 20:19


   Last Infusion: 17 21:38 Dose:  Infused


Magnesium Sulfate (Magnesium Sulfate 20 Gm In Water 500 Ml)  20 gm in 500 mls @ 

50 mls/hr IV ASDIRECTED [facility]


   Stop: 17 19:00


   Last Infusion: 17 19:23 Dose:  0 mls/hr


Ibuprofen (Motrin)  800 mg PO Q8H PRN


   PRN Reason: mild pain or fever


Ketorolac Tromethamine (Toradol) Confirm Administered Dose 30 mg .ROUTE .STK-

MED ONE


   Stop: 17 18:15


Ketorolac Tromethamine (Toradol)  15 mg IVPUSH Q6H [facility]


   Stop: 17 08:01


   Last Admin: 17 00:16 Dose:  Not Given


Ketorolac Tromethamine (Toradol)  15 mg IVPUSH Q6H [facility]


   Stop: 17 14:01


   Last Admin: 17 13:53 Dose:  15 mg


Labetalol HCl (Normodyne)  100 mg PO TID [facility]


   Last Admin: 17 17:15 Dose:  100 mg


Labetalol HCl (Normodyne)  100 mg PO BID [facility]


   Last Admin: 17 21:04 Dose:  100 mg


Lidocaine HCl (Xylocaine-Mpf 1%)  10 ml INJECT ASDIRECTED PRN


   PRN Reason: Perineal Repair


Midazolam HCl (Versed 1 Mg/Ml) Confirm Administered Dose 2 mg .ROUTE .STK-MED 

ONE


   Stop: 17 18:14


Misoprostol (Cytotec)  25 mcg VAG Q4H PRN


   PRN Reason: cervical ripening


   Stop: 17 20:43


Morphine Sulfate (Duramorph Pf) Confirm Administered Dose 1 mg .ROUTE .STK-MED 

ONE


   Stop: 17 18:15


Nalbuphine HCl (Nubain)  10 mg IVPUSH Q3H PRN


   PRN Reason: Pain (moderate 4-6)


Ondansetron HCl (Zofran)  4 mg IV Q4H PRN


   PRN Reason: Nausea/Vomiting


Ondansetron HCl (Zofran) Confirm Administered Dose 4 mg .ROUTE .STK-MED ONE


   Stop: 17 18:15


Oxytocin (Pitocin)  10 unit IM ONETIME ONE


   Stop: 17 16:39


   Last Admin: 17 22:00 Dose:  Not Given











- Infant Interaction


Infant Disposition, Postpartum:  to Nursery


Infant Interaction: Not Interacting


Infant Feeding: Bottle Fed Infant


Support Person: 





- Postpartum Recovery Exam


Fundal Tone: Firm


Fundal Level: 3 Fingerbreadths Below Umbilicus


Fundal Placement: Midline


Lochia Amount: Scant


Lochia Color: Rubra/Red


Perineum Description: Intact, Minimal Bruising/Swelling


Episiotomy/Laceration: None


Bladder Status: Voiding


Urinary Elimination: Voided





- Exam


General: Alert, Oriented


HEENT: Pupils Equal, Pupils Reactive


Lungs: Clear to Auscultation, Normal Respiratory Effort


Cardiovascular: Regular Rate, Regular Rhythm, No Murmurs


GI/Abdominal Exam: Normal Bowel Sounds


Extremities: Normal Inspection, Pedal Edema (Trace bilaterally)


Skin: Warm, Dry, Intact


Wound/Incisions: Healing Well, No Drainage.  No: Erythema





- Problem List & Annotations


(1) Prenatal care in third trimester


SNOMED Code(s): 353669919, 14196661, 66744769, 246208197, 926660016


   Code(s): Z34.93 - ENCNTR FOR SUPRVSN OF NORMAL PREG, UNSP, THIRD TRIMESTER   

Status: Acute   Current Visit: Yes   





(2) Preeclampsia


SNOMED Code(s): 572229594


   Code(s): O14.90 - UNSPECIFIED PRE-ECLAMPSIA, UNSPECIFIED TRIMESTER   Status: 

Acute   Current Visit: Yes   





(3) Status post  delivery


SNOMED Code(s): 600627626


   Code(s): Z98.891 - HISTORY OF UTERINE SCAR FROM PREVIOUS SURGERY   Status: 

Acute   Current Visit: Yes   





- Problem List Review


Problem List Initiated/Reviewed/Updated: Yes





- My Orders


Last 24 Hours: 


My Active Orders





17 08:00


hydrALAZINE [Apresoline]   25 mg PO Q8H 














- Assessment


Assessment:: 





26-year-old, now , POD#3 status post primary  section for 

preeclampsia and non-reassuring fetal heart tones





- Plan


Plan:: 





1. Continue routine postoperative cares


2. As patient is not tolerating labetalol well and has persistent elevated 

blood pressures, advised her that I did not think it was safe to discharge her 

today. Labetalol was discontinued and hydralazine 25 mg every 8 hours was 

started. Continue to monitor blood pressures closely.


3. Patient decided to bottlefeed


4. Anticipate discharge 17 if her blood pressures are normalized.





Iliana Avila MD

## 2017-08-22 NOTE — PCM.DCSUM1
**Discharge Summary





- Hospital Course


Free Text/Narrative:: 


26-year-old now  postoperative day #4 status post primary  section 

for preeclampsia and nonreassuring fetal status at 39w0d








- Discharge Data


Discharge Date: 17


Discharge Disposition: Home, Self-Care 01


Condition: Good





- Discharge Diagnosis/Problem(s)


(1) Prenatal care in third trimester


SNOMED Code(s): 120760412, 37979132, 61882725, 576011493, 259899333


   ICD Code: Z34.93 - ENCNTR FOR SUPRVSN OF NORMAL PREG, UNSP, THIRD TRIMESTER 

  Status: Acute   





(2) Preeclampsia


SNOMED Code(s): 782400927


   ICD Code: O14.90 - UNSPECIFIED PRE-ECLAMPSIA, UNSPECIFIED TRIMESTER   Status

: Acute   





(3) Status post  delivery


SNOMED Code(s): 122650699


   ICD Code: Z98.891 - HISTORY OF UTERINE SCAR FROM PREVIOUS SURGERY   Status: 

Acute   





- Patient Summary/Data


Operative Procedure(s) Performed: primary low transverse  section


Complications: none


Consults: 


 Consultations





17 19:57


Consult to Lactation Consultant [CONS] Routine 











Labs Pending at D/C: 





none


Recommended Follow-up Testing/Procedures: 





repeat blood pressure in 48 hours


Planned Operative Procedure(s) after DC: none





- Patient Instructions


Diet: Usual Diet as Tolerated


Activity: No Lifting Over 20 Pounds, No Strenuous Activities, Rest and Relax 

Today


Driving: Do Not Drive


Showering/Bathing: May Shower


Wound/Incision Care: Keep Operative Site/Wound Site Clean and Dry


Notify Provider of: Fever, Increased Pain, Swelling and Redness, Drainage, 

Nausea and/or Vomiting





- Discharge Plan


Home Medications: 


 Home Meds





PNV95/Ferrous Fumarate/FA [Prenatal Tablet] 1 tab PO DAILY 17 [History]


Acetaminophen [Tylenol] 650 mg PO Q4H PRN #0 tablet 17 [Rx]


Acetaminophen/oxyCODONE [Percocet 325-5 MG] 2 tab PO Q4H PRN #0 tablet 17 

[Rx]


Docusate Sodium [Colace] 100 mg PO Q12H PRN #0 cap 17 [Rx]


Ibuprofen [IJD: Ibuprofen] 800 mg PO Q8H PRN #0 tablet 17 [Rx]


hydrALAZINE [Apresoline] 50 mg PO Q8H  tablet 17 [Rx]








Patient Handouts:   Delivery, Care After, Postpartum Hypertension, Home 

Care Instructions for Mom


Referrals: 


Iliana Avila MD [Primary Care Provider] -  (Will need to schedule 6 

week post-partum follow-up appt.)





- Discharge Summary/Plan Comment


DC Time >30 min.: No


Discharge Summary/Plan Comment: 


patient discharged home today. Hydralazine increased to 50 mg 3 times per day. 

Patient will have repeat blood pressure check in the clinic on 2017. 

Patient advised to contact the clinic or OB floor if she develops headache, 

vision changes, or any other concerning symptoms. Other reasons to return 

sooner were discussed with the patient, and she voiced her understanding.





Iliana Avila MD








- General Info


Date of Service: 17


Admission Dx/Problem (Free Text: 


 Patient Status Order with Admit Dx/Problem





17 16:39


Patient Status [ADT] Routine 








 Admission Diagnosis/Problem











Admission Diagnosis/Problem    High risk pregnancy














Subjective Update: 


26-year-old, now , postpartum day #4 status post primary  section 

for preeclampsia and nonreassuring fetal status. Patient has been feeling well 

over the past 24 hours. She has had adequate urine output. She is tolerating a 

general diet. She denies any dizziness, headaches, or abdominal pain. She has 

had minimal vaginal bleeding. patient's blood pressures have been improved over 

the past 24 hours but still are at times elevated with a systolic as high as 

150 and a diastolic as high as 90. She is tolerating 25 mg hydralazine 3 times 

daily without any side effects. She has no other concerns today and hopes to be 

discharged.





Functional Status: Reports: Pain Controlled, Tolerating Diet, Ambulating, 

Urinating.  Denies: New Symptoms





- Review of Systems


General: Reports: No Symptoms


HEENT: Reports: No Symptoms


Pulmonary: Reports: No Symptoms


Cardiovascular: Reports: No Symptoms


Gastrointestinal: Reports: No Symptoms


Genitourinary: Reports: No Symptoms


Musculoskeletal: Reports: No Symptoms


Skin: Reports: No Symptoms


Neurological: Reports: No Symptoms





- Patient Data


Vitals - Most Recent: 


 Last Vital Signs











Temp  36.9 C   17 08:00


 


Pulse  98   17 12:00


 


Resp  16   17 08:00


 


BP  141/87 H  17 12:00


 


Pulse Ox  100   17 08:00











Weight - Most Recent: 109.316 kg


Med Orders - Current: 


 Current Medications








Discontinued Medications





Acetaminophen (Tylenol)  650 mg PO Q4H PRN


   PRN Reason: Pain (Mild 1-3) and fever


Calcium Gluconate (Calcium Gluconate)  1 gm IVPUSH ONETIME ONE


   Stop: 17 21:01


   Last Admin: 17 07:21 Dose:  Not Given


Calcium Gluconate (Calcium Gluconate)  1 gm IVPUSH ONETIME ONE


   Stop: 17 21:46


   Last Admin: 17 07:21 Dose:  Not Given


Carboprost Tromethamine (Hemabate Ds)  250 mcg IM ASDIRECTED PRN


   PRN Reason: HEMORRHAGE


Citric Acid/Sodium Citrate (Bicitra Solution)  30 ml PO ONETIME ONE


   Stop: 17 17:56


   Last Admin: 17 18:18 Dose:  30 ml


Diphenhydramine HCl (Benadryl)  25 mg IVPUSH Q6H PRN


   PRN Reason: Itching or Nausea


   Last Admin: 17 00:03 Dose:  25 mg


Docusate Sodium (Colace)  100 mg PO Q12H PRN


   PRN Reason: Constipation


   Last Admin: 17 08:19 Dose:  100 mg


Ephedrine Sulfate (Ephedrine Sulfate)  5 mg IVPUSH SEECOMMENT PRN


   PRN Reason: Other


Ephedrine Sulfate (Ephedrine Sulfate)  10 mg IV .STK-MED ONE


   Stop: 17 12:23


Fentanyl (Sublimaze)  50 mcg IVPUSH Q1H PRN


   PRN Reason: Pain (moderate 4-6)


Ferrous Sulfate (Ferrous Sulfate)  325 mg PO BIDMEALS EAN


   Last Admin: 17 08:19 Dose:  325 mg


Hydralazine HCl (Apresoline)  25 mg PO Q8H EAN


   Last Admin: 17 08:20 Dose:  25 mg


Lactated Ringer's (Ringers, Lactated)  500 mls @ 999 mls/hr IV .BOLUS ONE


   Stop: 17 17:08


   Last Admin: 17 20:00 Dose:  Not Given


Lactated Ringer's (Ringers, Lactated)  1,000 mls @ 125 mls/hr IV ASDIRECTED EAN


   Last Admin: 17 18:19 Dose:  125 mls/hr


Oxytocin/Sodium Chloride (Pitocin In Ns 30 Unit/500 Ml)  30 unit in 500 mls @ 2 

mls/hr IV TITRATE EAN; 2 MUNITS/MIN


   PRN Reason: Protocol


   Last Admin: 17 19:45 Dose:  2 munits/min, 125 mls/hr


Cefazolin Sodium/Dextrose 2 gm (/ Premix)  50 mls @ 100 mls/hr IV ONETIME ONE


   Stop: 17 18:24


   Last Admin: 17 18:30 Dose:  100 mls/hr


Oxytocin/Sodium Chloride (Pitocin In Ns 30 Unit/500 Ml) Confirm Administered 

Dose 60 unit in 1,000 mls @ as directed .ROUTE .STK-MED ONE


   Stop: 17 18:23


Lactated Ringer's (Ringers, Lactated)  1,000 mls @ 125 mls/hr IV ASDIRECTED Former[facility]


   Last Admin: 17 16:18 Dose:  50 mls/hr


Magnesium Sulfate 4 gm/ Premix  100 mls @ 300 mls/hr IV .BOLUS ONE


   Stop: 17 20:19


   Last Infusion: 17 21:38 Dose:  Infused


Magnesium Sulfate (Magnesium Sulfate 20 Gm In Water 500 Ml)  20 gm in 500 mls @ 

50 mls/hr IV ASDIRECTED EAN


   Stop: 17 19:00


   Last Infusion: 17 19:23 Dose:  0 mls/hr


Oxytocin/Sodium Chloride (Pitocin In Ns 30 Unit/500 Ml)  30 unit in 500 mls @ 

as directed IV .STK-MED ONE


   Stop: 17 16:42


Ibuprofen (Motrin)  800 mg PO Q8H PRN


   PRN Reason: mild pain or fever


Ibuprofen (Motrin)  800 mg PO Q8H PRN


   PRN Reason: mild pain or fever


   Last Admin: 17 03:59 Dose:  800 mg


Ketorolac Tromethamine (Toradol) Confirm Administered Dose 30 mg .ROUTE .STK-

MED ONE


   Stop: 17 18:15


Ketorolac Tromethamine (Toradol)  15 mg IVPUSH Q6H [facility]


   Stop: 17 08:01


   Last Admin: 17 00:16 Dose:  Not Given


Ketorolac Tromethamine (Toradol)  15 mg IVPUSH Q6H [facility]


   Stop: 17 14:01


   Last Admin: 17 13:53 Dose:  15 mg


Ketorolac Tromethamine (Toradol)  30 mg IVPUSH .STK-MED ONE


   Stop: 17 12:23


Labetalol HCl (Normodyne)  20 mg IV Q6HR PRN; Protocol


   PRN Reason: Hypertension


   Last Admin: 17 17:16 Dose:  20 mg


Labetalol HCl (Normodyne)  100 mg PO TID [facility]


   Last Admin: 17 17:15 Dose:  100 mg


Labetalol HCl (Normodyne)  100 mg PO BID [facility]


   Last Admin: 17 21:04 Dose:  100 mg


Lidocaine HCl (Xylocaine-Mpf 1%)  10 ml INJECT ASDIRECTED PRN


   PRN Reason: Perineal Repair


Methylergonovine Maleate (Methergine)  0.2 mg IM ASDIRECTED PRN


   PRN Reason: Hemorrhage


Midazolam HCl (Versed 1 Mg/Ml) Confirm Administered Dose 2 mg .ROUTE .STK-MED 

ONE


   Stop: 17 18:14


Misoprostol (Cytotec)  800 mcg RECTAL ASDIRECTED PRN


   PRN Reason: Hemorrhage


Misoprostol (Cytotec)  25 mcg VAG Q4H PRN


   PRN Reason: cervical ripening


   Stop: 17 20:43


Morphine Sulfate (Duramorph Pf) Confirm Administered Dose 1 mg .ROUTE .STK-MED 

ONE


   Stop: 17 18:15


Morphine Sulfate (Duramorph Pf)  0.25 mg .XX .STK-MED ONE


   Stop: 17 12:23


Nalbuphine HCl (Nubain)  10 mg IVPUSH Q3H PRN


   PRN Reason: Pain (moderate 4-6)


Naloxone HCl (Narcan)  0.1 mg IVPUSH SEECOMMENT PRN


   PRN Reason: Respiratory Depression


Ondansetron HCl (Zofran)  4 mg IV Q4H PRN


   PRN Reason: Nausea/Vomiting


Ondansetron HCl (Zofran) Confirm Administered Dose 4 mg .ROUTE .STK-MED ONE


   Stop: 17 18:15


Ondansetron HCl (Zofran)  4 mg IV Q4H PRN


   PRN Reason: Nausea/Vomiting


Ondansetron HCl (Zofran)  4 mg IV .STK-MED ONE


   Stop: 17 12:23


Oxycodone/Acetaminophen (Percocet 325-5 Mg)  1 tab PO Q4H PRN


   PRN Reason: Pain (moderate 4-6)


   Last Admin: 17 01:15 Dose:  1 tab


Oxycodone/Acetaminophen (Percocet 325-5 Mg)  2 tab PO Q4H PRN


   PRN Reason: Pain (moderate 4-6)


   Last Admin: 17 08:20 Dose:  2 tab


Oxytocin (Pitocin)  10 unit IM ONETIME ONE


   Stop: 17 16:39


   Last Admin: 17 22:00 Dose:  Not Given


Prenat Multivit//Iron/Folic Ac (Prenatal Plus Iron)  1 each PO 

WITHBREAKFAST EAN


   Last Admin: 17 08:19 Dose:  1 each


Simethicone (Simethicone)  80 mg PO Q4H PRN


   PRN Reason: Gas


   Last Admin: 17 08:19 Dose:  80 mg


Sodium Chloride (Saline Flush)  10 ml FLUSH ASDIRECTED PRN


   PRN Reason: Keep Vein Open











- Exam


General: Reports: Alert, Oriented


HEENT: Reports: Pupils Equal, Pupils Reactive


Cardiovascular: Reports: Regular Rate, Regular Rhythm, No Murmurs


GI/Abdominal Exam: Soft, Non-Tender


Extremities: Normal Inspection, Normal Range of Motion, Pedal Edema (trace 

bilaterally)


Skin: Reports: Warm, Dry, Intact


Wound/Incisions: Reports: Healing Well, No Drainage.  Denies: Erythema





*Q Meaningful Use (DIS)





- VTE *Q


VTE Criteria *Q: 








- Stroke *Q


Stroke Criteria *Q: 








- AMI *Q


AMI Criteria *Q:

## 2017-10-23 NOTE — EDM.PDOC
ED HPI GENERAL MEDICAL PROBLEM





- General


Chief Complaint: Laceration


Stated Complaint: LACERATION ON ARM, [id number]


Time Seen by Provider: 10/23/17 17:00


Source of Information: Reports: Patient, Family, RN, RN Notes Reviewed


History Limitations: Reports: No Limitations





- History of Present Illness


INITIAL COMMENTS - FREE TEXT/NARRATIVE: 


Patient presents to the ER with c/o a fall at home. She states she dropped her 

2 month old infant after tripping over a dog. She states she caught her arm on 

a metal sign in the home. She is bleeding and quite tearful and concerned about 

her infant. She states she last had her tetanus vaccination during pregnancy a 

few months ago. Pt states she can wiggle her fingers and has feeling in her 

fingers. 


Onset: Today, Sudden


  ** Left Arm


Pain Score (Numeric/FACES): 5





- Related Data


 Allergies











Allergy/AdvReac Type Severity Reaction Status Date / Time


 


Penicillins Allergy  Hives Verified 10/23/17 16:58











Home Meds: 


 Home Meds





NK [No Known Home Meds] 0 mg PO DAILY 10/23/17 [History]











Past Medical History





- Past Health History


Medical/Surgical History: Denies Medical/Surgical History


Cardiovascular History: Reports: Hypertension


Other Cardiovascular History: Gestational HTN


OB/GYN History: Reports: Pregnancy





Social & Family History





- Family History


Family Medical History: Noncontributory


Cardiac: Reports: CAD


OBGYN: Reports: Other (See Below)





- Tobacco Use


Smoking Status *Q: Never Smoker


Second Hand Smoke Exposure: No





- Caffeine Use


Caffeine Use: Reports: Soda





- Recreational Drug Use


Recreational Drug Use: No





ED ROS GENERAL





- Review of Systems


Review Of Systems: ROS reveals no pertinent complaints other than HPI.





ED EXAM, SKIN/RASH


Exam: See Below


Exam Limited By: No Limitations


General Appearance: Alert, WD/WN, Anxious, Other (Mom very concerned about her 

infant. )


Eye Exam: Bilateral Eye: Normal Inspection


Ears: Normal External Exam


Nose: Normal Inspection


Throat/Mouth: Normal Inspection, Normal Lips, Normal Voice, No Airway Compromise


Head: Atraumatic, Normocephalic


Neck: Normal Inspection, Supple, Non-Tender, Full Range of Motion


Respiratory/Chest: No Respiratory Distress, Lungs Clear, Normal Breath Sounds, 

No Accessory Muscle Use, Chest Non-Tender


Cardiovascular: Normal Peripheral Pulses, Regular Rate, Rhythm, No Edema, No 

Gallop, No JVD, No Murmur, No Rub


Peripheral Pulses: 2+: Radial (L), Radial (R)


GI/Abdominal: Normal Bowel Sounds, Soft, Non-Tender


 (Female) Exam: Deferred


Rectal (Female) Exam: Deferred


Back Exam: Normal Inspection, Full Range of Motion


Extremities: Normal Inspection, Normal Range of Motion, Non-Tender, No Pedal 

Edema


Neurological: Alert, Oriented, Normal Cognition, Normal Gait, No Motor/Sensory 

Deficits


Psychiatric: Normal Affect, Anxious, Tearful


Skin: Warm, Dry, Normal Color, Other (laceration to the left upper medial arm, 

as well as left medial forearm)


Location, Skin: Upper Extremity, Left


Lymphatic: No Adenopathy





ED SKIN PROCEDURES





- Laceration/Wound Repair


  ** Left Middle Medial Arm


Lac/Wound length In cm: 6 (L shaped (4cm x 2 cm), Upper medial arm 2cm)


Appearance: Subcutaneous, Irregular, Clean


Distal NVT: Neuro & Vascular Intact, No Tendon Injury


Anesthetic Type: Local


Local Anesthesia - Lidocaine (Xylocaine): 1% Plain


Local Anesthetic Volume: Other (16cc)


Skin Prep: Chlorhexidine (Hibiciens)


Exploration/Debridement/Repair: Wound Explored, In a Bloodless Field, Explored 

to Base, No Foreign Material Found


Closed with: Sutures


Suture Size: 4-0


# of Sutures: 18 (16 placed in the proximal forearm, 2 placed in medial upper 

arm)


Suture Type: Nylon, Interrupted





Course





- Vital Signs


Last Recorded V/S: 


 Last Vital Signs











Temp  98.4 F   10/23/17 16:59


 


Pulse  96   10/23/17 16:59


 


Resp  16   10/23/17 16:59


 


BP  159/105 H  10/23/17 16:59


 


Pulse Ox  100   10/23/17 16:59














- Orders/Labs/Meds


Meds: 


Medications














Discontinued Medications














Generic Name Dose Route Start Last Admin





  Trade Name Sacha  PRN Reason Stop Dose Admin


 


Bacitracin  1 dose  10/23/17 17:15  10/23/17 18:49





  Bacitracin Oint 1 Gm  TOP  10/23/17 17:16  1 dose





  ONETIME ONE   Administration


 


Lidocaine HCl  30 ml  10/23/17 17:15  10/23/17 18:49





  Xylocaine-Mpf 1%  INJECT  10/23/17 17:16  16 ml





  ONETIME ONE   Administration














Departure





- Departure


Time of Disposition: 18:56


Disposition: Home, Self-Care 01


Condition: Good


Clinical Impression: 


 Laceration





Contusion


Qualifiers:


 Encounter type: initial encounter Contusion area: forearm Laterality: left 

Qualified Code(s): S50.12XA - Contusion of left forearm, initial encounter








- Discharge Information


Instructions:  Contusion, Easy-to-Read, Laceration Care, Adult, Easy-to-Read, 

Stitches, Staples, or Adhesive Wound Closure, Easy-to-Read, Abrasion, Easy-to-

Read


Forms:  ED Department Discharge


Additional Instructions: 


Stitches can be taken out in the clinic in 10 days.


Keep wounds clean and dry for 24 hours, after that, avoid scrubbing the area.


Follow up with your primary care facility.

## 2019-12-30 ENCOUNTER — HOSPITAL ENCOUNTER (EMERGENCY)
Dept: HOSPITAL 43 - DL.ED | Age: 29
Discharge: HOME | End: 2019-12-30
Payer: COMMERCIAL

## 2019-12-30 VITALS — DIASTOLIC BLOOD PRESSURE: 100 MMHG | SYSTOLIC BLOOD PRESSURE: 152 MMHG

## 2019-12-30 VITALS — HEART RATE: 110 BPM

## 2019-12-30 DIAGNOSIS — J03.90: Primary | ICD-10-CM

## 2019-12-30 DIAGNOSIS — Z88.0: ICD-10-CM

## 2019-12-30 DIAGNOSIS — I15.9: ICD-10-CM

## 2019-12-30 PROCEDURE — 99283 EMERGENCY DEPT VISIT LOW MDM: CPT

## 2019-12-30 PROCEDURE — 87430 STREP A AG IA: CPT

## 2019-12-30 PROCEDURE — 87081 CULTURE SCREEN ONLY: CPT

## 2019-12-30 NOTE — EDM.PDOC
ED HPI GENERAL MEDICAL PROBLEM





- General


Chief Complaint: ENT Problem


Stated Complaint: SORE THROAT


Time Seen by Provider: 19 00:15


Source of Information: Reports: Patient


History Limitations: Reports: No Limitations





- History of Present Illness


INITIAL COMMENTS - FREE TEXT/NARRATIVE: 





sore throat since yesterday. had gestational HTN. on no BP Rx. denies CP/SOB/

HA. only sore throat.


Treatments PTA: Reports: Acetaminophen, Other Medication(s)


  ** Throat


Pain Score (Numeric/FACES): 9





- Related Data


 Allergies











Allergy/AdvReac Type Severity Reaction Status Date / Time


 


Penicillins Allergy  Hives Verified 19 00:07











Home Meds: 


 Home Meds





. [No Known Home Meds]  19 [History]











Past Medical History





- Past Health History


Medical/Surgical History: Denies Medical/Surgical History


Cardiovascular History: Reports: Hypertension


Other Cardiovascular History: Gestational HTN


OB/GYN History: Reports: Pregnancy





- Past Surgical History


Female  Surgical History: Reports:  Section





Social & Family History





- Family History


Family Medical History: Noncontributory


Cardiac: Reports: CAD


OBGYN: Reports: Other (See Below)





- Tobacco Use


Smoking Status *Q: Never Smoker


Second Hand Smoke Exposure: No





- Caffeine Use


Caffeine Use: Reports: None





- Recreational Drug Use


Recreational Drug Use: No





ED ROS ENT





- Review of Systems


Review Of Systems: Comprehensive ROS is negative, except as noted in HPI.





ED EXAM, ENT





- Physical Exam


Exam: See Below


Exam Limited By: No Limitations


General Appearance: Alert, WD/WN, Mild Distress, Other (discomfort)


Ears: Hearing Grossly Normal


Mouth/Throat: Pharyngeal Erythema, Tonsillar Erythema, Tonsillar Swelling


Head: Atraumatic


Neck: Supple, Non-Tender


Respiratory/Chest: No Respiratory Distress


Cardiovascular: Regular Rate, Rhythm


GI/Abdominal: Soft, Non-Tender


Neurological: Alert, Oriented, Normal Cognition, Normal Gait, No Motor/Sensory 

Deficits


Psychiatric: Flat Affect


Skin: Warm, Dry, Normal Color


Lymphatic: No Adenopathy





Course





- Vital Signs


Last Recorded V/S: 


 Last Vital Signs











Temp  36.6 C   19 00:10


 


Pulse  110 H  19 00:10


 


Resp  14   19 00:10


 


BP  152/100 H  19 00:59


 


Pulse Ox  97   19 00:10














- Orders/Labs/Meds


Orders: 


 Active Orders 24 hr











 Category Date Time Status


 


 CULTURE STREP A CONFIRMATION [RM] Stat Lab  19 00:05 Results


 


 STREP SCRN A RAPID W CULT CONF [RM] Stat Lab  19 00:05 Results











Meds: 


Medications














Discontinued Medications














Generic Name Dose Route Start Last Admin





  Trade Name Sacha  PRN Reason Stop Dose Admin


 


Azithromycin  500 mg  19 00:55  19 00:59





  Zithromax  PO  19 00:56  500 mg





  ONETIME ONE   Administration





     





     





     





     


 


Clonidine HCl  0.1 mg  19 00:15  19 00:19





  Catapres  PO  19 00:16  0.1 mg





  ONETIME ONE   Administration





     





     





     





     


 


Clonidine HCl  0.1 mg  19 00:55  19 00:59





  Catapres  PO  19 00:56  0.1 mg





  ONETIME ONE   Administration





     





     





     





     


 


Ibuprofen  600 mg  19 00:15  19 00:19





  Motrin  PO  19 00:16  600 mg





  ONETIME ONE   Administration





     





     





     





     














- Re-Assessments/Exams


Free Text/Narrative Re-Assessment/Exam: 





19 00:56


results discussed with pt.


19 01:20


re-exam; BP lower 120/84





Departure





- Departure


Time of Disposition: 01:20


Disposition: Home, Self-Care 01


Condition: Good


Clinical Impression: 


 Tonsillopharyngitis





Hypertension


Qualifiers:


 Hypertension type: unspecified secondary hypertension Qualified Code(s): I15.9 

- Secondary hypertension, unspecified; I15 - Secondary hypertension








- Discharge Information


Instructions:  Tonsillitis, Easy-to-Read


Forms:  ED Department Discharge


Additional Instructions: 


1) rest


2) avoid solid foods


3) see clinic tomorrow for BP management





rx given;


[id number]





Sepsis Event Note





- Evaluation


Sepsis Screening Result: No Definite Risk





- Focused Exam


Vital Signs: 


 Vital Signs











  Temp Pulse Resp BP BP Pulse Ox


 


 19 00:59     152/100 H  


 


 19 00:19     148/101 H  


 


 19 00:10  36.6 C  110 H  14   150/103 H  97











Date Exam was Performed: 19


Time Exam was Performed: 01:20





- My Orders


Last 24 Hours: 


My Active Orders





19 00:05


CULTURE STREP A CONFIRMATION [RM] Stat 


STREP SCRN A RAPID W CULT CONF [RM] Stat 














- Assessment/Plan


Last 24 Hours: 


My Active Orders





19 00:05


CULTURE STREP A CONFIRMATION [RM] Stat 


STREP SCRN A RAPID W CULT CONF [RM] Stat